# Patient Record
Sex: FEMALE | Race: OTHER | NOT HISPANIC OR LATINO | URBAN - METROPOLITAN AREA
[De-identification: names, ages, dates, MRNs, and addresses within clinical notes are randomized per-mention and may not be internally consistent; named-entity substitution may affect disease eponyms.]

---

## 2024-03-21 ENCOUNTER — OUTPATIENT (OUTPATIENT)
Dept: OUTPATIENT SERVICES | Facility: HOSPITAL | Age: 34
LOS: 1 days | End: 2024-03-21
Payer: MEDICAID

## 2024-03-21 VITALS
HEART RATE: 97 BPM | TEMPERATURE: 98 F | RESPIRATION RATE: 17 BRPM | SYSTOLIC BLOOD PRESSURE: 124 MMHG | DIASTOLIC BLOOD PRESSURE: 79 MMHG | OXYGEN SATURATION: 99 %

## 2024-03-21 DIAGNOSIS — Z98.891 HISTORY OF UTERINE SCAR FROM PREVIOUS SURGERY: Chronic | ICD-10-CM

## 2024-03-21 DIAGNOSIS — O26.899 OTHER SPECIFIED PREGNANCY RELATED CONDITIONS, UNSPECIFIED TRIMESTER: ICD-10-CM

## 2024-03-21 PROCEDURE — 59025 FETAL NON-STRESS TEST: CPT

## 2024-03-21 PROCEDURE — 99212 OFFICE O/P EST SF 10 MIN: CPT

## 2024-03-21 PROCEDURE — G0463: CPT

## 2024-03-21 NOTE — OB PROVIDER TRIAGE NOTE - NS_OBACUITYLEVEL_OBGYN_ALL_OB
Chief Complaint   Patient presents with    Sinus Infection     pt has sinus infection and states she doesnt feel any better     Visit Vitals  BP (!) 154/88 (BP 1 Location: Left upper arm, BP Patient Position: Sitting, BP Cuff Size: Adult)   Pulse 90   Temp 98.6 °F (37 °C) (Oral)   Resp 18   Ht 5' 4.5\" (1.638 m)   Wt 237 lb 12.8 oz (107.9 kg)   SpO2 99%   BMI 40.19 kg/m²     1. Have you been to the ER, urgent care clinic since your last visit? Hospitalized since your last visit? No    2. Have you seen or consulted any other health care providers outside of the [address] [place] [address] since your last visit? Include any pap smears or colon screening.  No 3

## 2024-03-21 NOTE — OB PROVIDER TRIAGE NOTE - NSHPPHYSICALEXAM_GEN_ALL_CORE
/79, HR 97     General: patient is resting comfortably in bed, NAD, A&Ox3  Abdominal: gravid uterus, soft nontender, no guarding or rebound tenderness  Vaginal: no visible blood or fluid  SVE: closed/long/posterior/intact  Extremities: no edema    FHT: baseline: 150, moderate variability, + accels, no decels  occasional toco

## 2024-03-21 NOTE — OB PROVIDER TRIAGE NOTE - HISTORY OF PRESENT ILLNESS
[id number]  32 yo  at 38.6 weeks  (LMP: 23, ALEJANDRO:3/29/24) presenting for possible labor. Patient reports pink, mucusy discharge starting this morning at 8 am. Denies vaginal bleeding, contractions. Reports + fetal movement. Denies headache, visual disturbances, epigastric pain, chest pain, shortness of breath, abdominal pain.     PNC: follows at [facility], uncomplicated   OBHx:   1) 3/3/2011 FT c/s in [facility] for nuchal cord and failure to dilate per patient, boy 8 lb, otherwise uncomplicated  2) 2022 complete SAB, 6 weeks   GynHx: denies fibroids, cysts, STD, abnormal PAP  PMHx: denies   Meds: PNV  PSHx: c/s in   Allergies: no known allergies   Social: denies tobacco/etoh/drug use   Psych: denies anxiety, depression, PTSD

## 2024-03-21 NOTE — OB PROVIDER TRIAGE NOTE - ADDITIONAL INSTRUCTIONS
continue prenatal vitamins   If you experience contractions, vaginal bleeding, or you break your water,  return to delivery room  Check the baby movements daily with fetal kick count  Follow up at [facility] today to schedule c section

## 2024-03-21 NOTE — OB PROVIDER TRIAGE NOTE - NS_OBGYNHISTORY_OBGYN_ALL_OB_FT
Primary csection boy 2011 FT umbical cord around neck, doctor told patient she needed at  at 7cm  SAB complete 2022 8wks    see above

## 2024-03-21 NOTE — OB RN TRIAGE NOTE - NSLDARRIVAL_OBGYN_ALL_OB_START_DATE
Drink 64-80oz of fluid per day.    Stop sertraline.    When stressed, walk 15 minutes and DO NOT EAT.    Event monitor.   See neurology.    Flonase 2 sprays each nostril daily.    5 lb weight loss.    
21-Mar-2024 10:30

## 2024-03-21 NOTE — OB PROVIDER TRIAGE NOTE - PLAN OF CARE
follow up at [facility] to schedule repeat c section continue prenatal vitamins   If you experience contractions, vaginal bleeding, or you break your water,  return to delivery room  Check the baby movements daily with fetal kick count  Follow up at [facility] today to schedule c section

## 2024-03-21 NOTE — OB PROVIDER TRIAGE NOTE - NSOBPROVIDERNOTE_OBGYN_ALL_OB_FT
32 yo  at 38.6 weeks  (LMP: 23, ALEJANDRO:3/29/24) presenting for possible labor, uncomplicated     - close maternal and fetal monitoring  - discussed decision to TOLAC or have repeat c section, patient desires repeat c section   - Patient to follow up at [facility] today   - discharge instructions given

## 2024-03-21 NOTE — OB RN TRIAGE NOTE - NS_OBGYNHISTORY_OBGYN_ALL_OB_FT
Primary csection boy 2011 FT umbical cord around neck, doctor told patient she needed at  at 7cm  SAB complete 2022 8wks

## 2024-03-21 NOTE — OB RN TRIAGE NOTE - FALL HARM RISK - UNIVERSAL INTERVENTIONS
Bed in lowest position, wheels locked, appropriate side rails in place/Call bell, personal items and telephone in reach/Instruct patient to call for assistance before getting out of bed or chair/Non-slip footwear when patient is out of bed/[place] to call system/Physically safe environment - no spills, clutter or unnecessary equipment/Purposeful Proactive Rounding/Room/bathroom lighting operational, light cord in reach

## 2024-03-22 DIAGNOSIS — Z87.59 PERSONAL HISTORY OF OTHER COMPLICATIONS OF PREGNANCY, CHILDBIRTH AND THE PUERPERIUM: ICD-10-CM

## 2024-03-22 DIAGNOSIS — Z3A.38 38 WEEKS GESTATION OF PREGNANCY: ICD-10-CM

## 2024-03-22 DIAGNOSIS — O09.293 SUPERVISION OF PREGNANCY WITH OTHER POOR REPRODUCTIVE OR OBSTETRIC HISTORY, THIRD TRIMESTER: ICD-10-CM

## 2024-03-22 DIAGNOSIS — O34.219 MATERNAL CARE FOR UNSPECIFIED TYPE SCAR FROM PREVIOUS CESAREAN DELIVERY: ICD-10-CM

## 2024-03-22 DIAGNOSIS — Z03.71 ENCOUNTER FOR SUSPECTED PROBLEM WITH AMNIOTIC CAVITY AND MEMBRANE RULED OUT: ICD-10-CM

## 2024-03-25 ENCOUNTER — TRANSCRIPTION ENCOUNTER (OUTPATIENT)
Age: 34
End: 2024-03-25

## 2024-03-26 ENCOUNTER — INPATIENT (INPATIENT)
Facility: HOSPITAL | Age: 34
LOS: 1 days | Discharge: HOME | End: 2024-03-28
Attending: OBSTETRICS & GYNECOLOGY | Admitting: OBSTETRICS & GYNECOLOGY
Payer: MEDICAID

## 2024-03-26 VITALS
TEMPERATURE: 99 F | RESPIRATION RATE: 18 BRPM | WEIGHT: 201.94 LBS | DIASTOLIC BLOOD PRESSURE: 80 MMHG | HEIGHT: 64 IN | HEART RATE: 87 BPM | OXYGEN SATURATION: 100 % | SYSTOLIC BLOOD PRESSURE: 115 MMHG

## 2024-03-26 DIAGNOSIS — O26.899 OTHER SPECIFIED PREGNANCY RELATED CONDITIONS, UNSPECIFIED TRIMESTER: ICD-10-CM

## 2024-03-26 DIAGNOSIS — Z98.891 HISTORY OF UTERINE SCAR FROM PREVIOUS SURGERY: Chronic | ICD-10-CM

## 2024-03-26 DIAGNOSIS — O34.219 MATERNAL CARE FOR UNSPECIFIED TYPE SCAR FROM PREVIOUS CESAREAN DELIVERY: ICD-10-CM

## 2024-03-26 PROBLEM — Z78.9 OTHER SPECIFIED HEALTH STATUS: Chronic | Status: ACTIVE | Noted: 2024-03-21

## 2024-03-26 LAB
ABO RH CONFIRMATION: SIGNIFICANT CHANGE UP
APTT BLD: 35 SEC — SIGNIFICANT CHANGE UP (ref 24.5–35.6)
BASOPHILS # BLD AUTO: 0.02 K/UL — SIGNIFICANT CHANGE UP (ref 0–0.2)
BASOPHILS NFR BLD AUTO: 0.2 % — SIGNIFICANT CHANGE UP (ref 0–2)
EOSINOPHIL # BLD AUTO: 0.04 K/UL — SIGNIFICANT CHANGE UP (ref 0–0.5)
EOSINOPHIL NFR BLD AUTO: 0.5 % — SIGNIFICANT CHANGE UP (ref 0–6)
FIBRINOGEN PPP-MCNC: 582 MG/DL — HIGH (ref 200–475)
HCT VFR BLD CALC: 37.6 % — SIGNIFICANT CHANGE UP (ref 34.5–45)
HGB BLD-MCNC: 12.5 G/DL — SIGNIFICANT CHANGE UP (ref 11.5–15.5)
IMM GRANULOCYTES NFR BLD AUTO: 0.7 % — SIGNIFICANT CHANGE UP (ref 0–0.9)
INR BLD: 0.92 RATIO — SIGNIFICANT CHANGE UP (ref 0.85–1.18)
LYMPHOCYTES # BLD AUTO: 1.84 K/UL — SIGNIFICANT CHANGE UP (ref 1–3.3)
LYMPHOCYTES # BLD AUTO: 21.1 % — SIGNIFICANT CHANGE UP (ref 13–44)
MCHC RBC-ENTMCNC: 28.8 PG — SIGNIFICANT CHANGE UP (ref 27–34)
MCHC RBC-ENTMCNC: 33.2 GM/DL — SIGNIFICANT CHANGE UP (ref 32–36)
MCV RBC AUTO: 86.6 FL — SIGNIFICANT CHANGE UP (ref 80–100)
MONOCYTES # BLD AUTO: 0.43 K/UL — SIGNIFICANT CHANGE UP (ref 0–0.9)
MONOCYTES NFR BLD AUTO: 4.9 % — SIGNIFICANT CHANGE UP (ref 2–14)
NEUTROPHILS # BLD AUTO: 6.31 K/UL — SIGNIFICANT CHANGE UP (ref 1.8–7.4)
NEUTROPHILS NFR BLD AUTO: 72.6 % — SIGNIFICANT CHANGE UP (ref 43–77)
NRBC # BLD: 0 /100 WBCS — SIGNIFICANT CHANGE UP (ref 0–0)
PLATELET # BLD AUTO: 318 K/UL — SIGNIFICANT CHANGE UP (ref 150–400)
PROTHROM AB SERPL-ACNC: 10.5 SEC — SIGNIFICANT CHANGE UP (ref 9.5–13)
RBC # BLD: 4.34 M/UL — SIGNIFICANT CHANGE UP (ref 3.8–5.2)
RBC # FLD: 14.4 % — SIGNIFICANT CHANGE UP (ref 10.3–14.5)
T PALLIDUM AB TITR SER: NEGATIVE — SIGNIFICANT CHANGE UP
WBC # BLD: 8.7 K/UL — SIGNIFICANT CHANGE UP (ref 3.8–10.5)
WBC # FLD AUTO: 8.7 K/UL — SIGNIFICANT CHANGE UP (ref 3.8–10.5)

## 2024-03-26 PROCEDURE — 59514 CESAREAN DELIVERY ONLY: CPT | Mod: U9,GC

## 2024-03-26 RX ORDER — SIMETHICONE 80 MG/1
80 TABLET, CHEWABLE ORAL EVERY 4 HOURS
Refills: 0 | Status: DISCONTINUED | OUTPATIENT
Start: 2024-03-26 | End: 2024-03-28

## 2024-03-26 RX ORDER — SODIUM CHLORIDE 9 MG/ML
1000 INJECTION, SOLUTION INTRAVENOUS
Refills: 0 | Status: DISCONTINUED | OUTPATIENT
Start: 2024-03-26 | End: 2024-03-26

## 2024-03-26 RX ORDER — ACETAMINOPHEN 500 MG
1000 TABLET ORAL ONCE
Refills: 0 | Status: COMPLETED | OUTPATIENT
Start: 2024-03-26 | End: 2024-03-26

## 2024-03-26 RX ORDER — TETANUS TOXOID, REDUCED DIPHTHERIA TOXOID AND ACELLULAR PERTUSSIS VACCINE, ADSORBED 5; 2.5; 8; 8; 2.5 [IU]/.5ML; [IU]/.5ML; UG/.5ML; UG/.5ML; UG/.5ML
0.5 SUSPENSION INTRAMUSCULAR ONCE
Refills: 0 | Status: DISCONTINUED | OUTPATIENT
Start: 2024-03-26 | End: 2024-03-28

## 2024-03-26 RX ORDER — SODIUM CHLORIDE 9 MG/ML
1000 INJECTION, SOLUTION INTRAVENOUS ONCE
Refills: 0 | Status: COMPLETED | OUTPATIENT
Start: 2024-03-26 | End: 2024-03-26

## 2024-03-26 RX ORDER — OXYTOCIN 10 UNIT/ML
333.33 VIAL (ML) INJECTION
Qty: 20 | Refills: 0 | Status: DISCONTINUED | OUTPATIENT
Start: 2024-03-26 | End: 2024-03-28

## 2024-03-26 RX ORDER — KETOROLAC TROMETHAMINE 30 MG/ML
30 SYRINGE (ML) INJECTION EVERY 6 HOURS
Refills: 0 | Status: DISCONTINUED | OUTPATIENT
Start: 2024-03-26 | End: 2024-03-27

## 2024-03-26 RX ORDER — DIPHENHYDRAMINE HCL 50 MG
25 CAPSULE ORAL EVERY 6 HOURS
Refills: 0 | Status: DISCONTINUED | OUTPATIENT
Start: 2024-03-26 | End: 2024-03-28

## 2024-03-26 RX ORDER — CITRIC ACID/SODIUM CITRATE 300-500 MG
30 SOLUTION, ORAL ORAL ONCE
Refills: 0 | Status: COMPLETED | OUTPATIENT
Start: 2024-03-26 | End: 2024-03-26

## 2024-03-26 RX ORDER — OXYTOCIN 10 UNIT/ML
333.33 VIAL (ML) INJECTION
Qty: 20 | Refills: 0 | Status: DISCONTINUED | OUTPATIENT
Start: 2024-03-26 | End: 2024-03-26

## 2024-03-26 RX ORDER — FAMOTIDINE 10 MG/ML
20 INJECTION INTRAVENOUS ONCE
Refills: 0 | Status: COMPLETED | OUTPATIENT
Start: 2024-03-26 | End: 2024-03-26

## 2024-03-26 RX ORDER — IBUPROFEN 200 MG
600 TABLET ORAL EVERY 6 HOURS
Refills: 0 | Status: COMPLETED | OUTPATIENT
Start: 2024-03-27 | End: 2025-02-23

## 2024-03-26 RX ORDER — HEPARIN SODIUM 5000 [USP'U]/ML
5000 INJECTION INTRAVENOUS; SUBCUTANEOUS EVERY 12 HOURS
Refills: 0 | Status: DISCONTINUED | OUTPATIENT
Start: 2024-03-26 | End: 2024-03-28

## 2024-03-26 RX ORDER — ACETAMINOPHEN 500 MG
975 TABLET ORAL
Refills: 0 | Status: DISCONTINUED | OUTPATIENT
Start: 2024-03-27 | End: 2024-03-28

## 2024-03-26 RX ORDER — MAGNESIUM HYDROXIDE 400 MG/1
30 TABLET, CHEWABLE ORAL
Refills: 0 | Status: DISCONTINUED | OUTPATIENT
Start: 2024-03-26 | End: 2024-03-28

## 2024-03-26 RX ORDER — LANOLIN
1 OINTMENT (GRAM) TOPICAL EVERY 6 HOURS
Refills: 0 | Status: DISCONTINUED | OUTPATIENT
Start: 2024-03-26 | End: 2024-03-28

## 2024-03-26 RX ORDER — CHLORHEXIDINE GLUCONATE 213 G/1000ML
1 SOLUTION TOPICAL DAILY
Refills: 0 | Status: DISCONTINUED | OUTPATIENT
Start: 2024-03-26 | End: 2024-03-26

## 2024-03-26 RX ORDER — CEFAZOLIN SODIUM 1 G
2000 VIAL (EA) INJECTION ONCE
Refills: 0 | Status: COMPLETED | OUTPATIENT
Start: 2024-03-26 | End: 2024-03-26

## 2024-03-26 RX ORDER — OXYCODONE HYDROCHLORIDE 5 MG/1
5 TABLET ORAL
Refills: 0 | Status: COMPLETED | OUTPATIENT
Start: 2024-03-27 | End: 2024-04-03

## 2024-03-26 RX ORDER — SODIUM CHLORIDE 9 MG/ML
1000 INJECTION, SOLUTION INTRAVENOUS
Refills: 0 | Status: DISCONTINUED | OUTPATIENT
Start: 2024-03-26 | End: 2024-03-28

## 2024-03-26 RX ADMIN — Medication 100 MILLIGRAM(S): at 08:05

## 2024-03-26 RX ADMIN — SODIUM CHLORIDE 125 MILLILITER(S): 9 INJECTION, SOLUTION INTRAVENOUS at 22:00

## 2024-03-26 RX ADMIN — SODIUM CHLORIDE 2000 MILLILITER(S): 9 INJECTION, SOLUTION INTRAVENOUS at 07:04

## 2024-03-26 RX ADMIN — CHLORHEXIDINE GLUCONATE 1 APPLICATION(S): 213 SOLUTION TOPICAL at 08:07

## 2024-03-26 RX ADMIN — Medication 400 MILLIGRAM(S): at 20:40

## 2024-03-26 RX ADMIN — SODIUM CHLORIDE 125 MILLILITER(S): 9 INJECTION, SOLUTION INTRAVENOUS at 08:04

## 2024-03-26 RX ADMIN — FAMOTIDINE 20 MILLIGRAM(S): 10 INJECTION INTRAVENOUS at 08:05

## 2024-03-26 RX ADMIN — Medication 1000 MILLIUNIT(S)/MIN: at 10:45

## 2024-03-26 RX ADMIN — Medication 30 MILLILITER(S): at 08:05

## 2024-03-26 RX ADMIN — Medication 1000 MILLIGRAM(S): at 21:40

## 2024-03-26 NOTE — OB PROVIDER H&P - HISTORY OF PRESENT ILLNESS
34 yo  at 39.4 weeks  (LMP: 23, ALEJANDRO:3/29/24) presenting for scheduled repeat  section. Denies vaginal bleeding, contractions. Reports + fetal movement. Denies headache, visual disturbances, epigastric pain, chest pain, shortness of breath, abdominal pain.     PNC: follows at [facility], uncomplicated   OBHx:   1) 3/3/2011 [facility] for [facility]uchal cord and failure to dilate per patient, boy 8 lb, otherwise uncomplicated  2) 2022 complete SAB, 6 weeks   GynHx: denies fibroids, cysts, STI, abnormal PAP  PMHx: denies   Meds: PNV  PSHx: c/s in   Allergies: no known allergies   Social: denies tobacco/etoh/drug use   Psych: denies anxiety, depression, PTSD. Feels safe at home.  Height: 5'4"  Weight: 202lbs

## 2024-03-26 NOTE — OB PROVIDER H&P - NSHPPHYSICALEXAM_GEN_ALL_CORE
General: patient is resting comfortably in bed, NAD, A&Ox3  Abdominal: gravid uterus, soft nontender, no guarding or rebound tenderness  Vaginal: no visible blood or fluid  Extremities: no edema

## 2024-03-26 NOTE — OB RN PATIENT PROFILE - THE IMPORTANCE OF THE CORRECT PLACEMENT OF THE INFANT AND THE PARENT’S ABILITY TO ASSESS THE INFANT'S SKIN COLOR WHILE PLACED ON SKIN TO SKIN HAS BEEN REINFORCED.
-Chronic  unstable condition  -Take medications as directed at onset of headache so that it will not become disabling  -Keep a headache diary to monitor triggers and treatment effectiveness  -Do not use OTC analgesics on a daily basis to prevent rebound headache-overuse of medication for even a few months can make headache worse or chronic  -It is important to manage stress as this can greatly exacerbate your headaches  -Ensure you have a consistent sleep schedule, getting at least 8 hours per night  -Ensure adequate nutrition with routine meal schedule and hydration (64 oz of water per day), daily exercise  -Annual eye exam with optometrist  -Here are the supplements I recommend for migraine prophylaxis:  - Riboflavin 400 mg daily  - CoQ-10 100 mg twice daily  - Magnesium citrate 600 mg daily (hold for diarrhea/loose stools) and or Magnesium glycinate 250-500 mg nightly  -Melatonin 3 mg at bedtime     Statement Selected

## 2024-03-26 NOTE — OB PROVIDER H&P - ASSESSMENT
Patient Seen in: [facility] [place]    32yo  presents at 39w4d for scheduled repeat .   Plan  -Proceed with  schedule   -2 units PRBC on hold  -routine admission labs  -NPO  -2 IV lines placed  -d/w Dr. Aguilar, house officer

## 2024-03-26 NOTE — OB RN PATIENT PROFILE - BP NONINVASIVE DIASTOLIC (MM HG)
The utmost importance of complete smoking cessation has been discussed.  Patient clearly has significant underlying emphysema as well   80

## 2024-03-26 NOTE — OB RN PATIENT PROFILE - SPO2 (%)
Medication:   losartan (COZAAR) 50 MG tablet     Last office visit date: 10/19/2023  Medication Refill Protocol Failed.  Failed criteria: ARB Angiotension Receptor Blocker - Angiotension II Receptor Antagonist Refill Protocol - 12 Month Protocol Failed. Sent to clinician to review.   
100

## 2024-03-26 NOTE — OB PROVIDER H&P - PROBLEM SELECTOR PLAN 1
-Proceed with  schedule   -2 units PRBC on hold  -routine admission labs  -NPO  -2 IV lines placed  -d/w Dr. Aguilar, house officer

## 2024-03-26 NOTE — OB RN PATIENT PROFILE - VISION (WITH CORRECTIVE LENSES IF THE PATIENT USUALLY WEARS THEM):
Left voicemail asking patient to contact the office back regarding appointment on March 6. Patient scheduled incorrectly and appointment canceled.   
Normal vision: sees adequately in most situations; can see medication labels, newsprint

## 2024-03-27 LAB
ABO RH CONFIRMATION: SIGNIFICANT CHANGE UP
BASOPHILS # BLD AUTO: 0.03 K/UL — SIGNIFICANT CHANGE UP (ref 0–0.2)
BASOPHILS NFR BLD AUTO: 0.3 % — SIGNIFICANT CHANGE UP (ref 0–2)
EOSINOPHIL # BLD AUTO: 0.02 K/UL — SIGNIFICANT CHANGE UP (ref 0–0.5)
EOSINOPHIL NFR BLD AUTO: 0.2 % — SIGNIFICANT CHANGE UP (ref 0–6)
FETAL SCREEN: SIGNIFICANT CHANGE UP
HCT VFR BLD CALC: 37.1 % — SIGNIFICANT CHANGE UP (ref 34.5–45)
HGB BLD-MCNC: 12.4 G/DL — SIGNIFICANT CHANGE UP (ref 11.5–15.5)
IMM GRANULOCYTES NFR BLD AUTO: 0.6 % — SIGNIFICANT CHANGE UP (ref 0–0.9)
LYMPHOCYTES # BLD AUTO: 2.34 K/UL — SIGNIFICANT CHANGE UP (ref 1–3.3)
LYMPHOCYTES # BLD AUTO: 22 % — SIGNIFICANT CHANGE UP (ref 13–44)
MCHC RBC-ENTMCNC: 28.9 PG — SIGNIFICANT CHANGE UP (ref 27–34)
MCHC RBC-ENTMCNC: 33.4 GM/DL — SIGNIFICANT CHANGE UP (ref 32–36)
MCV RBC AUTO: 86.5 FL — SIGNIFICANT CHANGE UP (ref 80–100)
MONOCYTES # BLD AUTO: 0.48 K/UL — SIGNIFICANT CHANGE UP (ref 0–0.9)
MONOCYTES NFR BLD AUTO: 4.5 % — SIGNIFICANT CHANGE UP (ref 2–14)
NEUTROPHILS # BLD AUTO: 7.7 K/UL — HIGH (ref 1.8–7.4)
NEUTROPHILS NFR BLD AUTO: 72.4 % — SIGNIFICANT CHANGE UP (ref 43–77)
NRBC # BLD: 0 /100 WBCS — SIGNIFICANT CHANGE UP (ref 0–0)
PLATELET # BLD AUTO: 298 K/UL — SIGNIFICANT CHANGE UP (ref 150–400)
RBC # BLD: 4.29 M/UL — SIGNIFICANT CHANGE UP (ref 3.8–5.2)
RBC # FLD: 14.5 % — SIGNIFICANT CHANGE UP (ref 10.3–14.5)
WBC # BLD: 10.63 K/UL — HIGH (ref 3.8–10.5)
WBC # FLD AUTO: 10.63 K/UL — HIGH (ref 3.8–10.5)

## 2024-03-27 RX ORDER — SENNA PLUS 8.6 MG/1
2 TABLET ORAL AT BEDTIME
Refills: 0 | Status: DISCONTINUED | OUTPATIENT
Start: 2024-03-27 | End: 2024-03-28

## 2024-03-27 RX ORDER — OXYCODONE HYDROCHLORIDE 5 MG/1
5 TABLET ORAL
Refills: 0 | Status: DISCONTINUED | OUTPATIENT
Start: 2024-03-27 | End: 2024-03-28

## 2024-03-27 RX ORDER — IBUPROFEN 200 MG
600 TABLET ORAL EVERY 6 HOURS
Refills: 0 | Status: DISCONTINUED | OUTPATIENT
Start: 2024-03-27 | End: 2024-03-28

## 2024-03-27 RX ADMIN — OXYCODONE HYDROCHLORIDE 5 MILLIGRAM(S): 5 TABLET ORAL at 09:25

## 2024-03-27 RX ADMIN — Medication 975 MILLIGRAM(S): at 22:46

## 2024-03-27 RX ADMIN — Medication 30 MILLIGRAM(S): at 00:00

## 2024-03-27 RX ADMIN — Medication 30 MILLIGRAM(S): at 13:00

## 2024-03-27 RX ADMIN — SIMETHICONE 80 MILLIGRAM(S): 80 TABLET, CHEWABLE ORAL at 13:12

## 2024-03-27 RX ADMIN — SIMETHICONE 80 MILLIGRAM(S): 80 TABLET, CHEWABLE ORAL at 06:00

## 2024-03-27 RX ADMIN — Medication 975 MILLIGRAM(S): at 09:25

## 2024-03-27 RX ADMIN — Medication 975 MILLIGRAM(S): at 08:26

## 2024-03-27 RX ADMIN — Medication 30 MILLIGRAM(S): at 05:55

## 2024-03-27 RX ADMIN — Medication 30 MILLIGRAM(S): at 12:00

## 2024-03-27 RX ADMIN — SENNA PLUS 2 TABLET(S): 8.6 TABLET ORAL at 21:46

## 2024-03-27 RX ADMIN — Medication 600 MILLIGRAM(S): at 18:27

## 2024-03-27 RX ADMIN — OXYCODONE HYDROCHLORIDE 5 MILLIGRAM(S): 5 TABLET ORAL at 21:46

## 2024-03-27 RX ADMIN — Medication 975 MILLIGRAM(S): at 21:46

## 2024-03-27 RX ADMIN — Medication 975 MILLIGRAM(S): at 15:00

## 2024-03-27 RX ADMIN — Medication 30 MILLIGRAM(S): at 01:00

## 2024-03-27 RX ADMIN — Medication 30 MILLIGRAM(S): at 06:55

## 2024-03-27 RX ADMIN — HEPARIN SODIUM 5000 UNIT(S): 5000 INJECTION INTRAVENOUS; SUBCUTANEOUS at 23:47

## 2024-03-27 RX ADMIN — Medication 975 MILLIGRAM(S): at 16:00

## 2024-03-27 RX ADMIN — OXYCODONE HYDROCHLORIDE 5 MILLIGRAM(S): 5 TABLET ORAL at 08:26

## 2024-03-27 RX ADMIN — Medication 975 MILLIGRAM(S): at 02:36

## 2024-03-27 RX ADMIN — HEPARIN SODIUM 5000 UNIT(S): 5000 INJECTION INTRAVENOUS; SUBCUTANEOUS at 13:12

## 2024-03-27 RX ADMIN — HEPARIN SODIUM 5000 UNIT(S): 5000 INJECTION INTRAVENOUS; SUBCUTANEOUS at 00:00

## 2024-03-27 RX ADMIN — Medication 600 MILLIGRAM(S): at 17:36

## 2024-03-27 RX ADMIN — OXYCODONE HYDROCHLORIDE 5 MILLIGRAM(S): 5 TABLET ORAL at 22:46

## 2024-03-27 RX ADMIN — Medication 600 MILLIGRAM(S): at 23:46

## 2024-03-27 RX ADMIN — Medication 975 MILLIGRAM(S): at 03:36

## 2024-03-27 RX ADMIN — SIMETHICONE 80 MILLIGRAM(S): 80 TABLET, CHEWABLE ORAL at 19:25

## 2024-03-28 ENCOUNTER — TRANSCRIPTION ENCOUNTER (OUTPATIENT)
Age: 34
End: 2024-03-28

## 2024-03-28 VITALS
SYSTOLIC BLOOD PRESSURE: 107 MMHG | DIASTOLIC BLOOD PRESSURE: 72 MMHG | OXYGEN SATURATION: 98 % | TEMPERATURE: 98 F | HEART RATE: 72 BPM | RESPIRATION RATE: 16 BRPM

## 2024-03-28 LAB
BASOPHILS # BLD AUTO: 0.03 K/UL — SIGNIFICANT CHANGE UP (ref 0–0.2)
BASOPHILS NFR BLD AUTO: 0.3 % — SIGNIFICANT CHANGE UP (ref 0–2)
EOSINOPHIL # BLD AUTO: 0.08 K/UL — SIGNIFICANT CHANGE UP (ref 0–0.5)
EOSINOPHIL NFR BLD AUTO: 0.8 % — SIGNIFICANT CHANGE UP (ref 0–6)
HCT VFR BLD CALC: 35.7 % — SIGNIFICANT CHANGE UP (ref 34.5–45)
HGB BLD-MCNC: 12 G/DL — SIGNIFICANT CHANGE UP (ref 11.5–15.5)
IMM GRANULOCYTES NFR BLD AUTO: 0.9 % — SIGNIFICANT CHANGE UP (ref 0–0.9)
LYMPHOCYTES # BLD AUTO: 1.87 K/UL — SIGNIFICANT CHANGE UP (ref 1–3.3)
LYMPHOCYTES # BLD AUTO: 18.5 % — SIGNIFICANT CHANGE UP (ref 13–44)
MCHC RBC-ENTMCNC: 28.7 PG — SIGNIFICANT CHANGE UP (ref 27–34)
MCHC RBC-ENTMCNC: 33.6 GM/DL — SIGNIFICANT CHANGE UP (ref 32–36)
MCV RBC AUTO: 85.4 FL — SIGNIFICANT CHANGE UP (ref 80–100)
MONOCYTES # BLD AUTO: 0.61 K/UL — SIGNIFICANT CHANGE UP (ref 0–0.9)
MONOCYTES NFR BLD AUTO: 6 % — SIGNIFICANT CHANGE UP (ref 2–14)
NEUTROPHILS # BLD AUTO: 7.42 K/UL — HIGH (ref 1.8–7.4)
NEUTROPHILS NFR BLD AUTO: 73.5 % — SIGNIFICANT CHANGE UP (ref 43–77)
NRBC # BLD: 0 /100 WBCS — SIGNIFICANT CHANGE UP (ref 0–0)
PLATELET # BLD AUTO: 318 K/UL — SIGNIFICANT CHANGE UP (ref 150–400)
RBC # BLD: 4.18 M/UL — SIGNIFICANT CHANGE UP (ref 3.8–5.2)
RBC # FLD: 14.6 % — HIGH (ref 10.3–14.5)
WBC # BLD: 10.1 K/UL — SIGNIFICANT CHANGE UP (ref 3.8–10.5)
WBC # FLD AUTO: 10.1 K/UL — SIGNIFICANT CHANGE UP (ref 3.8–10.5)

## 2024-03-28 PROCEDURE — 86900 BLOOD TYPING SEROLOGIC ABO: CPT

## 2024-03-28 PROCEDURE — 86780 TREPONEMA PALLIDUM: CPT

## 2024-03-28 PROCEDURE — 86850 RBC ANTIBODY SCREEN: CPT

## 2024-03-28 PROCEDURE — 85730 THROMBOPLASTIN TIME PARTIAL: CPT

## 2024-03-28 PROCEDURE — 36415 COLL VENOUS BLD VENIPUNCTURE: CPT

## 2024-03-28 PROCEDURE — 59025 FETAL NON-STRESS TEST: CPT

## 2024-03-28 PROCEDURE — 59050 FETAL MONITOR W/REPORT: CPT

## 2024-03-28 PROCEDURE — 85461 HEMOGLOBIN FETAL: CPT

## 2024-03-28 PROCEDURE — 85384 FIBRINOGEN ACTIVITY: CPT

## 2024-03-28 PROCEDURE — 85610 PROTHROMBIN TIME: CPT

## 2024-03-28 PROCEDURE — 90707 MMR VACCINE SC: CPT

## 2024-03-28 PROCEDURE — 86923 COMPATIBILITY TEST ELECTRIC: CPT

## 2024-03-28 PROCEDURE — 85025 COMPLETE CBC W/AUTO DIFF WBC: CPT

## 2024-03-28 PROCEDURE — 86901 BLOOD TYPING SEROLOGIC RH(D): CPT

## 2024-03-28 RX ORDER — ACETAMINOPHEN 500 MG
3 TABLET ORAL
Qty: 168 | Refills: 0
Start: 2024-03-28 | End: 2024-04-10

## 2024-03-28 RX ORDER — IBUPROFEN 200 MG
1 TABLET ORAL
Qty: 28 | Refills: 0
Start: 2024-03-28 | End: 2024-04-03

## 2024-03-28 RX ORDER — SIMETHICONE 80 MG/1
1 TABLET, CHEWABLE ORAL
Qty: 56 | Refills: 0
Start: 2024-03-28 | End: 2024-04-10

## 2024-03-28 RX ORDER — SIMETHICONE 80 MG/1
1 TABLET, CHEWABLE ORAL
Qty: 16 | Refills: 0
Start: 2024-03-28 | End: 2024-03-31

## 2024-03-28 RX ORDER — SENNOSIDES/DOCUSATE SODIUM 8.6MG-50MG
2 TABLET ORAL
Qty: 28 | Refills: 0
Start: 2024-03-28 | End: 2024-04-10

## 2024-03-28 RX ORDER — IBUPROFEN 200 MG
1 TABLET ORAL
Qty: 56 | Refills: 0
Start: 2024-03-28 | End: 2024-04-10

## 2024-03-28 RX ORDER — ACETAMINOPHEN 500 MG
3 TABLET ORAL
Qty: 84 | Refills: 0
Start: 2024-03-28 | End: 2024-04-03

## 2024-03-28 RX ADMIN — Medication 600 MILLIGRAM(S): at 11:23

## 2024-03-28 RX ADMIN — Medication 975 MILLIGRAM(S): at 10:15

## 2024-03-28 RX ADMIN — SIMETHICONE 80 MILLIGRAM(S): 80 TABLET, CHEWABLE ORAL at 11:23

## 2024-03-28 RX ADMIN — Medication 975 MILLIGRAM(S): at 03:44

## 2024-03-28 RX ADMIN — Medication 0.5 MILLILITER(S): at 11:23

## 2024-03-28 RX ADMIN — Medication 600 MILLIGRAM(S): at 06:00

## 2024-03-28 RX ADMIN — Medication 600 MILLIGRAM(S): at 12:20

## 2024-03-28 RX ADMIN — Medication 600 MILLIGRAM(S): at 07:00

## 2024-03-28 RX ADMIN — Medication 975 MILLIGRAM(S): at 02:44

## 2024-03-28 RX ADMIN — Medication 975 MILLIGRAM(S): at 09:18

## 2024-03-28 RX ADMIN — Medication 600 MILLIGRAM(S): at 00:46

## 2024-03-28 NOTE — LACTATION INITIAL EVALUATION - LACTATION INTERVENTIONS
Reinforced benefits of  exclusive breastfeeding for first 6 months and provided with breastfeeding community resource list for breastfeeding support/assistance available post-discharge and of importance of early f/u with pediatrician within 2-3 days./initiate/review safe skin-to-skin/initiate/review hand expression/initiate/review techniques for position and latch/post discharge community resources provided/review techniques to increase milk supply/review techniques to manage sore nipples/engorgement/reviewed components of an effective feeding and at least 8 effective feedings per day required/reviewed importance of monitoring infant diapers, the breastfeeding log, and minimum output each day/reviewed risks of unnecessary formula supplementation/reviewed risks of artificial nipples/reviewed benefits and recommendations for rooming in/reviewed feeding on demand/by cue at least 8 times a day/reviewed indications of inadequate milk transfer that would require supplementation

## 2024-03-28 NOTE — DISCHARGE NOTE OB - NS MD DC FALL RISK RISK
For information on Fall & Injury Prevention, visit: [email or web address] OR  [email or web address] OR  https://www.cdc.gov/steadi/patient.html

## 2024-03-28 NOTE — PROGRESS NOTE ADULT - ASSESSMENT
A/P: 34 yo POD #0 s/p schedule repeat c/s @ 39 weeks 1 day, TAP block placed in OR; routine delivery and postpartum course     -continue close monitor   -continue post op care  - pain management as needed   -d/w Dr. Aguilar   
A/P: 34 yo POD #2 s/p scheduled repeat c/s @ 39 weeks 1 day, uncomplicated delivery and postpartum course, s/p rhogam   Patient requesting discharge today    -Pain management as needed  -continue post op care  -VTE prophylaxis: heparin, OOB and ambulate  -encourage incentive spirometer use  -Encourage breastfeeding   - Discharge home   - instructions verbalized   -follow up in 1-2weeks in office for incision check  -d/w Dr. Cavanaugh   
A/P: POD #1 s/p c/s @ 39w1d c/b by RH negative. FS - negative, Rhogam ordered.  pt stable and reaching recovery milestones.   -Pain management as needed  -DVT ppx: heparin , OOB and encourage ambulation   -f/u Rpt CBC   -Advance diet to regular  -Encourage breastfeeding   - encourage spirometer   -d/w enmanuel   - seen and evaluated with MEENAKSHI Hines

## 2024-03-28 NOTE — DISCHARGE NOTE OB - HOSPITAL COURSE
32 yo admitted for scheduled repeat c section @39 weeks 1 day, uncomplicated delivery and postpartum course, s/p rhogam   Patient is stable for discharge today

## 2024-03-28 NOTE — PROGRESS NOTE ADULT - PROBLEM SELECTOR PLAN 1
-Pain management as needed  -DVT ppx: heparin , OOB and encourage ambulation   -f/u Rpt CBC   -Advance diet to regular  -Encourage breastfeeding   - encourage spirometer   -d/w enmanuel   - seen and evaluated with MEENAKSHI Hines
-Pain management as needed  -continue post op care  -VTE prophylaxis: heparin, OOB and ambulate  -encourage incentive spirometer use  -Encourage breastfeeding   - Discharge home   - instructions verbalized   -follow up in 1-2weeks in office for incision check
-continue close monitor   -continue post op care  - pain management as needed

## 2024-03-28 NOTE — DISCHARGE NOTE OB - CARE PROVIDER_API CALL
[facility], [place]  Phone: (   )    -  Fax: (   )    -  Established Patient  Follow Up Time: 2 weeks

## 2024-03-28 NOTE — PROGRESS NOTE ADULT - SUBJECTIVE AND OBJECTIVE BOX
Patient seen at bedside resting comfortably offers no new complaints. Baby resting at bedside. + Ambulation, + void without difficulty, + flatus, + bm; tolerating regular diet. Pt both breastfeeding and bottle feeding. Pt denies weakness, headache, blurry vision or epigastric pain, chest pain, shortness of breath, N/V/D,  dizziness, palpitations, worsening vaginal bleeding or any other complaints.     Vital Signs Last 24 Hrs  T(C): 36.7 (28 Mar 2024 06:27), Max: 36.7 (27 Mar 2024 22:00)  T(F): 98.1 (28 Mar 2024 06:27), Max: 98.1 (27 Mar 2024 22:00)  HR: 72 (28 Mar 2024 06:27) (64 - 80)  BP: 107/72 (28 Mar 2024 06:27) (97/63 - 107/72)  RR: 16 (28 Mar 2024 06:27) (16 - 18)  SpO2: 98% (28 Mar 2024 06:27) (95% - 98%)  Parameters below as of 28 Mar 2024 06:27  Patient On (Oxygen Delivery Method): room air        Gen: A&O x 3, NAD  Breast: Soft, nontender, nonengorged  Abdomen: soft; Nontender, nondistended, Incision C/D/I steri strips in place   Gyn: Minimal lochia  Extremities: Nontender, no worsening edema                          12.4   10.63 )-----------( 298      ( 27 Mar 2024 06:21 )             37.1     
Pt seen at bedside resting comfortably and offers no complaints.  Medley catheter in place draining clear adequate urine. Pt denies CP, SOB, N/V, dizziness, palpitations or any other complaints.    T(C): 36.6 (03-26-24 @ 12:00), Max: 37.1 (03-26-24 @ 09:00)  HR: 88 (03-26-24 @ 13:45) (78 - 99)  BP: 103/54 (03-26-24 @ 13:45) (103/54 - 128/64)  RR: 20 (03-26-24 @ 13:45) (15 - 20)  SpO2: 97% (03-26-24 @ 13:45) (95% - 100%)    abd: soft/nt, fundus firm, dressing in place C/D/I  pelvic: minimal lochia  ext: venodynes in place; no calf tenderness                     12.5   8.70   )----------(  318       ( 26 Mar 2024 07:43 )               37.6      
Patient seen at bedside resting comfortably complaining of increased pain, making it difficult for her to ambulate. Medley removed, trial of void done, +flatus  no bowel movement; tolerating clear liquid diet.  Pt both breast and bottle feeding. Pt denies headache, weakness, chest pain, shortness of breath, blurry vision or epigastric pain, N/V/D,  palpitations, worsening vaginal bleeding.    Vital Signs Last 24 Hrs  T(C): 36.4 (27 Mar 2024 09:00), Max: 36.8 (26 Mar 2024 17:59)  T(F): 97.5 (27 Mar 2024 09:00), Max: 98.2 (26 Mar 2024 17:59)  HR: 80 (27 Mar 2024 09:00) (78 - 99)  BP: 97/63 (27 Mar 2024 09:00) (97/63 - 128/64)  BP(mean): 74 (27 Mar 2024 02:10) (68 - 84)  RR: 18 (27 Mar 2024 09:00) (15 - 20)  SpO2: 95% (27 Mar 2024 09:00) (95% - 100%)    Parameters below as of 27 Mar 2024 09:00  Patient On (Oxygen Delivery Method): room air        Gen: A&O x 3, NAD  Breast: Soft, nontender, nonengorged  Abdomen: +BS; soft; Nontender, nondistended; dressing removed incision C/D/I steri strips in place  Gyn: minimal lochia   Extremities: Nontender, venodynes in place, no calf tenderness                          12.4   10.63 )-----------( 298      ( 27 Mar 2024 06:21 )             37.1

## 2024-03-28 NOTE — DISCHARGE NOTE OB - PROVIDER TOKENS
FREE:[LAST:[[facility]],FIRST:[[facility]],PHONE:[(   )    -],FAX:[(   )    -],FOLLOWUP:[2 weeks],ESTABLISHEDPATIENT:[T]]

## 2024-03-28 NOTE — DISCHARGE NOTE OB - PATIENT PORTAL LINK FT
You can access the FollowMyHealth Patient Portal offered by [facility] by registering at the following website: [email or web address]. By joining [facility]’s FollowMyHealth portal, you will also be able to view your health information using other applications (apps) compatible with our system.

## 2024-03-28 NOTE — DISCHARGE NOTE OB - CARE PLAN
Principal Discharge DX:	 delivery delivered  Assessment and plan of treatment:	Continue breastfeeding.  Motrin as needed for pain.  Ambulate daily.  No heavy lifting or anything per vagina x 6 weeks - no sex, tampons, douching, tub baths, etc.  Follow up in office in 1-2 weeks for incision check, and then at 6 weeks for postpartum check.   1

## 2024-03-28 NOTE — DISCHARGE NOTE OB - TEMPERATURE GREATER THAN 100.0  F ORALLY
Statement Selected [place] Pregnancy And Lactation Text: There is insufficient data to evaluate drug-associated risk for major birth defects, miscarriage, or other adverse maternal or fetal outcomes.  There is a pregnancy registry that monitors pregnancy outcomes in pregnant persons exposed to the medication during pregnancy.  It is unknown if this medication is excreted in breast milk.  Do not breastfeed during treatment and for about 4 weeks after the last dose.

## 2024-04-01 PROBLEM — Z00.00 ENCOUNTER FOR PREVENTIVE HEALTH EXAMINATION: Status: ACTIVE | Noted: 2024-04-01

## 2024-04-02 ENCOUNTER — APPOINTMENT (OUTPATIENT)
Dept: ANTEPARTUM | Facility: CLINIC | Age: 34
End: 2024-04-02

## 2024-05-21 NOTE — OB PROVIDER H&P - NS_GBS_INFANT_INVASIVE_OBGYN_ALL_OB_FT
Called to OR for Cat II tracing for baby boy born AGA at 40+1 wks via unscheduled CS for arrest of descent to a 30 y/o  O+ blood type mother. Maternal history of GDMA1. PNL neg/nr/immune/-, Hep C NR, GBS - on . SROM at 1210 with clear fluids. Delivery complicated by cord around the body. Baby emerged vigorous, crying, was w/d/s/s with APGARS of 9/9.  Mom would like to breastfeed, consents to the Hep B vaccine and is unsure about circ at this time. Highest maternal temp. was 37.5 C, EOS 0.36    BW: 3350 g (AGA)    Physical Exam:  Gen: NAD, +grimace  HEENT: anterior fontanel open soft and flat, no cleft lip/palate, ears normal set, no ear pits or tags. no lesions in mouth/throat, nares clinically patent  Resp: no increased work of breathing, good air entry b/l, clear to auscultation bilaterally  Cardio: Normal S1/S2, regular rate and rhythm, no murmurs, rubs or gallops  Abd: soft, non tender, non distended, + bowel sounds, umbilical cord with 3 vessels  Neuro: +grasp/suck/haris, normal tone  Extremities: negative reid and ortolani, moving all extremities, full range of motion x 4, no crepitus  Skin: pink, warm  Genitals: Normal male anatomy, testicles palpable in scrotum b/l, Jerald 1, anus patent
Patient states no history
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
